# Patient Record
Sex: FEMALE | Race: OTHER | HISPANIC OR LATINO | URBAN - METROPOLITAN AREA
[De-identification: names, ages, dates, MRNs, and addresses within clinical notes are randomized per-mention and may not be internally consistent; named-entity substitution may affect disease eponyms.]

---

## 2019-09-19 ENCOUNTER — EMERGENCY (EMERGENCY)
Facility: HOSPITAL | Age: 54
LOS: 0 days | Discharge: HOME | End: 2019-09-20
Attending: EMERGENCY MEDICINE | Admitting: EMERGENCY MEDICINE
Payer: COMMERCIAL

## 2019-09-19 VITALS
TEMPERATURE: 97 F | DIASTOLIC BLOOD PRESSURE: 90 MMHG | SYSTOLIC BLOOD PRESSURE: 120 MMHG | RESPIRATION RATE: 18 BRPM | HEART RATE: 65 BPM | OXYGEN SATURATION: 99 %
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
## 2019-09-19 DIAGNOSIS — M25.579 PAIN IN UNSPECIFIED ANKLE AND JOINTS OF UNSPECIFIED FOOT: ICD-10-CM
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
## 2019-09-19 DIAGNOSIS — S92.351A DISPLACED FRACTURE OF FIFTH METATARSAL BONE, RIGHT FOOT, INITIAL ENCOUNTER FOR CLOSED FRACTURE: ICD-10-CM
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
## 2019-09-19 PROCEDURE — 99284 EMERGENCY DEPT VISIT MOD MDM: CPT

## 2019-09-19 PROCEDURE — 73630 X-RAY EXAM OF FOOT: CPT | Mod: 26,RT

## 2019-09-19 PROCEDURE — 73610 X-RAY EXAM OF ANKLE: CPT | Mod: 26,RT

## 2019-09-19 RX ORDER — IBUPROFEN 200 MG
600 TABLET ORAL ONCE
Refills: 0 | Status: COMPLETED | OUTPATIENT
Start: 2019-09-19 | End: 2019-09-19

## 2019-09-19 RX ADMIN — Medication 600 MILLIGRAM(S): at 08:26

## 2019-09-19 NOTE — ED PROVIDER NOTE - ATTENDING CONTRIBUTION TO CARE
54 y/o female with no relevant PMHx presents to ED with right foot pain and swelling s/p inversion injury sustained just prior to arrival while running for the bus.  Non ambulatory currently.  No other injuries noted.  PE:  agree with above.  A/P:  Right Foot Pain, r/o fx.  Imaging, analgesia.

## 2019-09-19 NOTE — ED PROVIDER NOTE - OBJECTIVE STATEMENT
52yo female with no significant PMHx presents c/o R foot pain/swelling s/p running to bus today. Patient states R foot inverted and immediately experienced pain and swelling overlying R 5th metatarsal, unable to bear weight. Reports throbbing pain radiating throughout entire R foot, constant.

## 2019-09-19 NOTE — ED PROVIDER NOTE - CLINICAL SUMMARY MEDICAL DECISION MAKING FREE TEXT BOX
Seen ane evaluated by Podiatry. Splint applied.  F/U tomorrow in the office.  All follow up information discussed.

## 2019-09-19 NOTE — ED PROVIDER NOTE - NSFOLLOWUPCLINICS_GEN_ALL_ED_FT
[facility]  Podiatry  .  [place]   Phone: [phone]  Fax:   Follow Up Time:

## 2019-09-19 NOTE — CONSULT NOTE ADULT - SUBJECTIVE AND OBJECTIVE BOX
PODIATRY CONSULT   KAYLEY RODRIGUEZ is a 53y Female Patient is a 53y old  Female who presents with a chief complaint of   HPI:    Podiatry: Pt. states that she was running to catch her bus this morning when she twisted her right ankle and felt something pop. Afterwards, he was unable to walk. Pt. denies any recent n/f/v/c/sob. Pt. denies any other pedal complaints at this time.            PMH:   PSH:   Medication   Allergy: No Known Allergies        Labs:                    ROS:  [x ]A ten point review of system was otherwise negative except as noted    Physical Exam - Right Lower Extremity Focused:   Derm:  Eccymosis noted at the lateral aspect of the foot encompassing the base of the 5th digit. No open lesions/wounds noted.  Vascular:   DP and PT pulses 2/4 (B/L). Cap re-fill time < then 3sec to the digits (B/L).  Skin temperature is warm to warm from proximal to distal (B/L)  Neuro:  - Protective sensation grossly intact   MSK:   Manual Muscle strength +3/5 in all muscle compartments (B/Ll). Pain elicited upon palpation at the base of the 5th met. ROM limited at the pedal joints.        Assessment:   initial encounter: 5th metatarsal fx, right foot    Plan:  Chart reviewed and Patient evaluated  Discussed diagnosis and treatment with patient  Applied posterior splint   X-rays reviewed : Pending report  Non-weight bearing  to right foot  Pt. can f/u as o/p at [address] w/ Dr. Preciado tomorrow  Pt. is to not remove posterior splint until seen by Dr. Preciado  Will discuss care plan  with  Attending

## 2019-09-19 NOTE — ED PROVIDER NOTE - NSFOLLOWUPINSTRUCTIONS_ED_ALL_ED_FT
Metatatarsal Fracture   metatarsal fracture is a break in one of the five bones that connect the toes to the rest of the foot. This may also be called a forefoot fracture. A metatarsal fracture may be:  A crack in the surface of the bone (stress fracture). This often occurs in athletes.  A break all the way through the bone (complete fracture).  The bone that connects to the little toe (fifth metatarsal) is most commonly fractured. Ballet dancers often fracture this bone.    What are the causes?  A metatarsal fracture may be caused by:  Sudden twisting of the foot.  Falling onto the foot.  Something heavy falling onto the foot.  Overuse or repetitive exercise.  What increases the risk?  This condition is more likely to develop in people who:  Play contact sports.  Do ballet.  Have a condition that causes the bones to become thin and brittle (osteoporosis).  Have a low calcium level.  What are the signs or symptoms?  Symptoms of this condition include:  Pain that gets worse when walking or standing.  Pain when pressing on the foot or moving the toes.  Swelling.  Bruising on the top or bottom of the foot.  How is this diagnosed?  This condition may be diagnosed based on:  Your symptoms.   Any recent foot injuries you have had.   A physical exam.  An X-ray of your foot. If you have a stress fracture, it may not show up on an X-ray, and you may need other imaging tests, such as:   A bone scan.  CT scan.   MRI.  How is this treated?  Treatment depends on how severe your fracture is and how the pieces of the broken bone line up with each other (alignment). Treatment may involve:  Wearing a cast, splint, or supportive boot on your foot.  Using crutches, and not putting any weight on your foot.  Having surgery to align broken bones (open reduction and internal fixation, ORIF).  Physical therapy.  Follow-up visits and X-rays to make sure you are healing.  Follow these instructions at home:  If you have a splint or a supportive boot:     Wear the splint or boot as told by your health care provider. Remove it only as told by your health care provider.  Loosen the splint or boot if your toes tingle, become numb, or turn cold and blue.   Keep the splint or boot clean.   If your splint or boot is not waterproof:   Do not let it get wet.   Cover it with a watertight covering when you take a bath or a shower.  If you have a cast:     Do not stick anything inside the cast to scratch your skin. Doing that increases your risk for infection.  Check the skin around the cast every day. Tell your health care provider about any concerns.  You may put lotion on dry skin around the edges of the cast. Do not put lotion on the skin underneath the cast.  Keep the cast clean.  If the cast is not waterproof:   Do not let it get wet.  Cover it with a watertight covering when you take a bath or a shower.  Activity     Do not use your affected leg to support your body weight until your health care provider says that you can. Use crutches as directed.  Ask your health care provider what activities are safe for you during recovery, and ask what activities you need to avoid.  Do physical therapy exercises as directed.  Driving     Do not drive or use heavy machinery while taking pain medicine.  Do not drive while wearing a cast, splint, or boot on a foot that you use for driving.  Managing pain, stiffness, and swelling     Image   If directed, put ice on painful areas:  Put ice in a plastic bag.  Place a towel between your skin and the bag.  If you have a removable splint or boot, remove it as told by your health care provider.  If you have a cast, place a towel between your cast and the bag.  Leave the ice on for 20 minutes, 2–3 times a day.  Move your toes often to avoid stiffness and to lessen swelling.  Raise (elevate) your lower leg above the level of your heart while you are sitting or lying down.  General instructions     Do not put pressure on any part of the cast or splint until it is fully hardened. This may take several hours.  Take over-the-counter and prescription medicines only as told by your health care provider.  Do not use any products that contain nicotine or tobacco, such as cigarettes and e-cigarettes. These can delay bone healing. If you need help quitting, ask your health care provider.  Do not take baths, swim, or use a hot tub until your health care provider approves. Ask your health care provider if you may take showers.  Keep all follow-up visits as told by your health care provider. This is important.  Contact a health care provider if you have:  Pain that gets worse or does not get better with medicine.   A fever.  A bad smell coming from your cast or splint.  Get help right away if you have:  Any of the following in your toes or your foot, even after loosening your splint (if applicable):   Numbness.   Tingling.   Coldness.  Blue skin.   Redness or swelling that gets worse.  Pain that suddenly becomes severe.  Summary  A metatarsal fracture is a break in one of the five bones that connect the toes to the rest of the foot.  Treatment depends on how severe your fracture is and how the pieces of the broken bone line up with each other (alignment). This may include wearing a cast, splint, or supportive boot, or using crutches. Sometimes surgery is needed to align the bones.  Ice and elevate your foot to help lessen the pain and swelling.  Make sure you know what symptoms should cause you to get help right away.  This information is not intended to replace advice given to you by your health care provider. Make sure you discuss any questions you have with your health care provider. PLEASE CALL PODIATRY CLINIC TODAY STATE THAT YOU WERE SEEN BY DR. CHRISTENSEN AND DR. ESTES AND WAS TOLD TO COME TO PODIATRY CLINIC TOMORROW.         Metatatarsal Fracture   metatarsal fracture is a break in one of the five bones that connect the toes to the rest of the foot. This may also be called a forefoot fracture. A metatarsal fracture may be:  A crack in the surface of the bone (stress fracture). This often occurs in athletes.  A break all the way through the bone (complete fracture).  The bone that connects to the little toe (fifth metatarsal) is most commonly fractured. Ballet dancers often fracture this bone.    What are the causes?  A metatarsal fracture may be caused by:  Sudden twisting of the foot.  Falling onto the foot.  Something heavy falling onto the foot.  Overuse or repetitive exercise.  What increases the risk?  This condition is more likely to develop in people who:  Play contact sports.  Do ballet.  Have a condition that causes the bones to become thin and brittle (osteoporosis).  Have a low calcium level.  What are the signs or symptoms?  Symptoms of this condition include:  Pain that gets worse when walking or standing.  Pain when pressing on the foot or moving the toes.  Swelling.  Bruising on the top or bottom of the foot.  How is this diagnosed?  This condition may be diagnosed based on:  Your symptoms.   Any recent foot injuries you have had.   A physical exam.  An X-ray of your foot. If you have a stress fracture, it may not show up on an X-ray, and you may need other imaging tests, such as:   A bone scan.  CT scan.   MRI.  How is this treated?  Treatment depends on how severe your fracture is and how the pieces of the broken bone line up with each other (alignment). Treatment may involve:  Wearing a cast, splint, or supportive boot on your foot.  Using crutches, and not putting any weight on your foot.  Having surgery to align broken bones (open reduction and internal fixation, ORIF).  Physical therapy.  Follow-up visits and X-rays to make sure you are healing.  Follow these instructions at home:  If you have a splint or a supportive boot:     Wear the splint or boot as told by your health care provider. Remove it only as told by your health care provider.  Loosen the splint or boot if your toes tingle, become numb, or turn cold and blue.   Keep the splint or boot clean.   If your splint or boot is not waterproof:   Do not let it get wet.   Cover it with a watertight covering when you take a bath or a shower.  If you have a cast:     Do not stick anything inside the cast to scratch your skin. Doing that increases your risk for infection.  Check the skin around the cast every day. Tell your health care provider about any concerns.  You may put lotion on dry skin around the edges of the cast. Do not put lotion on the skin underneath the cast.  Keep the cast clean.  If the cast is not waterproof:   Do not let it get wet.  Cover it with a watertight covering when you take a bath or a shower.  Activity     Do not use your affected leg to support your body weight until your health care provider says that you can. Use crutches as directed.  Ask your health care provider what activities are safe for you during recovery, and ask what activities you need to avoid.  Do physical therapy exercises as directed.  Driving     Do not drive or use heavy machinery while taking pain medicine.  Do not drive while wearing a cast, splint, or boot on a foot that you use for driving.  Managing pain, stiffness, and swelling     Image   If directed, put ice on painful areas:  Put ice in a plastic bag.  Place a towel between your skin and the bag.  If you have a removable splint or boot, remove it as told by your health care provider.  If you have a cast, place a towel between your cast and the bag.  Leave the ice on for 20 minutes, 2–3 times a day.  Move your toes often to avoid stiffness and to lessen swelling.  Raise (elevate) your lower leg above the level of your heart while you are sitting or lying down.  General instructions     Do not put pressure on any part of the cast or splint until it is fully hardened. This may take several hours.  Take over-the-counter and prescription medicines only as told by your health care provider.  Do not use any products that contain nicotine or tobacco, such as cigarettes and e-cigarettes. These can delay bone healing. If you need help quitting, ask your health care provider.  Do not take baths, swim, or use a hot tub until your health care provider approves. Ask your health care provider if you may take showers.  Keep all follow-up visits as told by your health care provider. This is important.  Contact a health care provider if you have:  Pain that gets worse or does not get better with medicine.   A fever.  A bad smell coming from your cast or splint.  Get help right away if you have:  Any of the following in your toes or your foot, even after loosening your splint (if applicable):   Numbness.   Tingling.   Coldness.  Blue skin.   Redness or swelling that gets worse.  Pain that suddenly becomes severe.  Summary  A metatarsal fracture is a break in one of the five bones that connect the toes to the rest of the foot.  Treatment depends on how severe your fracture is and how the pieces of the broken bone line up with each other (alignment). This may include wearing a cast, splint, or supportive boot, or using crutches. Sometimes surgery is needed to align the bones.  Ice and elevate your foot to help lessen the pain and swelling.  Make sure you know what symptoms should cause you to get help right away.  This information is not intended to replace advice given to you by your health care provider. Make sure you discuss any questions you have with your health care provider.

## 2019-09-19 NOTE — ED PROVIDER NOTE - PATIENT PORTAL LINK FT
You can access the FollowMyHealth Patient Portal offered by [facility] by registering at the following website: [email or web address]. By joining [employer]’s FollowMyHealth portal, you will also be able to view your health information using other applications (apps) compatible with our system.

## 2019-09-19 NOTE — ED PROVIDER NOTE - NS ED ROS FT
CONST: No fever, chills or bodyaches  MS: R foot pain  SKIN: No rashes  NEURO: No  paresthesias or LOC

## 2019-09-19 NOTE — ED PROVIDER NOTE - PHYSICAL EXAMINATION
CONST: Well appearing in NAD  CARD: Normal S1 S2; Normal rate and rhythm  RESP: Equal BS B/L, No wheezes, rhonchi or rales. No distress  MS: Moderate tenderness overlying R 5th metatarsal with swelling. Full sensation and motor intact of digits. No malleolar tenderness.  SKIN: Warm, dry, no acute rashes. Good turgor  NEURO: A&Ox3, No focal deficits. Strength 5/5 with no sensory deficits.

## 2019-09-19 NOTE — ED PROVIDER NOTE - PROGRESS NOTE DETAILS
Discussed with podiatry given the degree of avulsion and displacement. Resident reviewing films with attending and will callback for possible operative management. Awaiting podiatry Podiatry in ED

## 2019-09-24 ENCOUNTER — APPOINTMENT (OUTPATIENT)
Dept: PODIATRY | Facility: CLINIC | Age: 54
End: 2019-09-24
Payer: COMMERCIAL

## 2019-09-24 ENCOUNTER — OUTPATIENT (OUTPATIENT)
Dept: OUTPATIENT SERVICES | Facility: HOSPITAL | Age: 54
LOS: 1 days | Discharge: HOME | End: 2019-09-24

## 2019-09-24 VITALS — DIASTOLIC BLOOD PRESSURE: 82 MMHG | HEART RATE: 71 BPM | SYSTOLIC BLOOD PRESSURE: 125 MMHG | HEIGHT: 63 IN

## 2019-09-24 VITALS — WEIGHT: 160 LBS | HEIGHT: 64 IN | BODY MASS INDEX: 27.31 KG/M2

## 2019-09-24 DIAGNOSIS — Z78.9 OTHER SPECIFIED HEALTH STATUS: ICD-10-CM

## 2019-09-24 DIAGNOSIS — S92.909A UNSPECIFIED FRACTURE OF UNSPECIFIED FOOT, INITIAL ENCOUNTER FOR CLOSED FRACTURE: ICD-10-CM

## 2019-09-24 PROBLEM — Z00.00 ENCOUNTER FOR PREVENTIVE HEALTH EXAMINATION: Status: ACTIVE | Noted: 2019-09-24

## 2019-09-24 PROCEDURE — 99203 OFFICE O/P NEW LOW 30 MIN: CPT

## 2019-10-02 PROBLEM — S92.909A: Status: ACTIVE | Noted: 2019-10-02

## 2019-10-02 PROBLEM — Z78.9 KNOWN HEALTH PROBLEMS: NONE: Status: RESOLVED | Noted: 2019-10-02 | Resolved: 2019-10-02

## 2019-10-02 NOTE — PHYSICAL EXAM
[General Appearance - Alert] : alert [General Appearance - In No Acute Distress] : in no acute distress [Full Pulse] : the pedal pulses are present [Edema] : there was no peripheral edema [Abnormal Walk] : normal gait [Nail Clubbing] : no clubbing  or cyanosis of the fingernails [Musculoskeletal - Swelling] : no joint swelling seen [Motor Tone] : muscle strength and tone were normal [Skin Color & Pigmentation] : normal skin color and pigmentation [Skin Turgor] : normal skin turgor [] : no rash [Right Foot Was Examined] : The right foot was examined [Normal Appearance] : normal in appearance [Tenderness] : tender [Erythema] : not erythematous [Delayed in the Right Toes] : normal in the toes [Normal in Appearance] : normal in appearance [Normal] : normal [Full ROM] : with full range of motion [Swelling] : not swollen [Tenderness] : not tender [Erythema] : not erythematous [Delayed in the Left Toes] : normal in the toes [2+] : 2+ in the dorsalis pedis [Vibration Dec.] : normal vibratory sensation at the level of the toes [Position Sense Dec.] : normal position sense at the level of the toes [Diminished Throughout Right Foot] : normal tactile sensation with monofilament testing throughout right foot [Diminished Throughout Left Foot] : normal tactile sensation with monofilament testing throughout left foot [FreeTextEntry1] : 5th toe pain  [FreeTextEntry2] : 5th toe  [Deep Tendon Reflexes (DTR)] : deep tendon reflexes were 2+ and symmetric [Sensation] : the sensory exam was normal to light touch and pinprick [No Focal Deficits] : no focal deficits [Oriented To Time, Place, And Person] : oriented to person, place, and time [Impaired Insight] : insight and judgment were intact [Affect] : the affect was normal

## 2019-10-02 NOTE — HISTORY OF PRESENT ILLNESS
[FreeTextEntry1] : PAtient presents with right foot fracture seen in ED \par Patient states she had a fall and now has 5th toe pain

## 2019-10-02 NOTE — END OF VISIT
[] : Resident [Resident] : Resident [FreeTextEntry3] : agree with above note.  \par Was present and instructing resident during visit. \par All Procedure performed under direct supervision.  \par LORELEI Preciado DPM\par  [FreeTextEntry2] : agree with above note.  \par Was present and instructing resident during visit. \par All Procedure performed under direct supervision.  \par LORELEI Preciado DPM\par

## 2019-10-02 NOTE — ASSESSMENT
[FreeTextEntry1] : Patient examined, history and chart reviewed\par Lynette splint right 5th toe\par discussed xray in detail \par \par INTERPRETATION: CLINICAL HISTORY: Pain, without trauma \par \par TECHNIQUE: Frontal, lateral and oblique views of the right foot. \par \par COMPARISON: None. \par \par FINDINGS: There is a comminuted fracture of the base of the fifth \par metatarsal bone. \par \par There is no radiopaque foreign body. \par \par The joint spaces are normal. The bony mineralization is within normal \par limits. \par \par IMPRESSION: \par COMMINUTED FRACTURE OF THE BASE OF THE FIFTH METATARSAL BONE. \par \par Patient to follow up in clinic 1 month \par Discussed with patient etiology of fracture and healing \par

## 2019-10-09 ENCOUNTER — APPOINTMENT (OUTPATIENT)
Dept: INTERNAL MEDICINE | Facility: CLINIC | Age: 54
End: 2019-10-09